# Patient Record
Sex: FEMALE | Race: WHITE | Employment: FULL TIME | URBAN - METROPOLITAN AREA
[De-identification: names, ages, dates, MRNs, and addresses within clinical notes are randomized per-mention and may not be internally consistent; named-entity substitution may affect disease eponyms.]

---

## 2017-03-20 PROBLEM — O35.5XX0 SUSPECTED DAMAGE TO FETUS FROM MATERNAL DRUG USE: Status: ACTIVE | Noted: 2017-03-20

## 2017-06-28 ENCOUNTER — HOSPITAL ENCOUNTER (OUTPATIENT)
Dept: SURGERY | Age: 27
Discharge: HOME | End: 2017-06-28

## 2017-07-19 ENCOUNTER — HOSPITAL ENCOUNTER (INPATIENT)
Age: 27
LOS: 3 days | Discharge: HOME | End: 2017-07-22
Attending: OBSTETRICS & GYNECOLOGY | Admitting: OBSTETRICS & GYNECOLOGY
Payer: COMMERCIAL

## 2017-07-19 PROBLEM — Z3A.40 40 WEEKS GESTATION OF PREGNANCY: Status: ACTIVE | Noted: 2017-07-19

## 2017-07-19 PROBLEM — Z37.9 NORMAL LABOR: Status: ACTIVE | Noted: 2017-07-19

## 2017-07-19 LAB
ABO + RH BLD: NORMAL
AMPHET UR QL SCN: NEGATIVE
BACTERIA SPEC CULT: NORMAL
BARBITURATES UR QL SCN: NEGATIVE
BENZODIAZ UR QL: NEGATIVE
BLOOD GROUP ANTIBODIES SERPL: NORMAL
CANNABINOIDS UR QL SCN: NEGATIVE
COCAINE UR QL SCN: NEGATIVE
ERYTHROCYTE [DISTWIDTH] IN BLOOD BY AUTOMATED COUNT: 13.5 % (ref 11.9–14.6)
HCT VFR BLD AUTO: 38 % (ref 35.8–46.3)
HGB BLD-MCNC: 12.6 G/DL (ref 11.7–15.4)
MCH RBC QN AUTO: 30.7 PG (ref 26.1–32.9)
MCHC RBC AUTO-ENTMCNC: 33.2 G/DL (ref 31.4–35)
MCV RBC AUTO: 92.5 FL (ref 79.6–97.8)
METHADONE UR QL: NEGATIVE
OPIATES UR QL: NEGATIVE
PCP UR QL: NEGATIVE
PLATELET # BLD AUTO: 337 K/UL (ref 150–450)
PMV BLD AUTO: 10.7 FL (ref 10.8–14.1)
RBC # BLD AUTO: 4.11 M/UL (ref 4.05–5.25)
SERVICE CMNT-IMP: NORMAL
SPECIMEN EXP DATE BLD: NORMAL
WBC # BLD AUTO: 16 K/UL (ref 4.3–11.1)

## 2017-07-19 PROCEDURE — 80307 DRUG TEST PRSMV CHEM ANLYZR: CPT | Performed by: OBSTETRICS & GYNECOLOGY

## 2017-07-19 PROCEDURE — 65270000029 HC RM PRIVATE

## 2017-07-19 PROCEDURE — 87641 MR-STAPH DNA AMP PROBE: CPT | Performed by: OBSTETRICS & GYNECOLOGY

## 2017-07-19 PROCEDURE — 86900 BLOOD TYPING SEROLOGIC ABO: CPT | Performed by: OBSTETRICS & GYNECOLOGY

## 2017-07-19 PROCEDURE — 85027 COMPLETE CBC AUTOMATED: CPT | Performed by: OBSTETRICS & GYNECOLOGY

## 2017-07-19 PROCEDURE — 74011250636 HC RX REV CODE- 250/636: Performed by: OBSTETRICS & GYNECOLOGY

## 2017-07-19 PROCEDURE — 36415 COLL VENOUS BLD VENIPUNCTURE: CPT | Performed by: OBSTETRICS & GYNECOLOGY

## 2017-07-19 PROCEDURE — 74011250637 HC RX REV CODE- 250/637: Performed by: OBSTETRICS & GYNECOLOGY

## 2017-07-19 PROCEDURE — 4A1HXCZ MONITORING OF PRODUCTS OF CONCEPTION, CARDIAC RATE, EXTERNAL APPROACH: ICD-10-PCS | Performed by: OBSTETRICS & GYNECOLOGY

## 2017-07-19 RX ORDER — LIDOCAINE HYDROCHLORIDE 20 MG/ML
JELLY TOPICAL
Status: DISCONTINUED | OUTPATIENT
Start: 2017-07-19 | End: 2017-07-20 | Stop reason: HOSPADM

## 2017-07-19 RX ORDER — SODIUM CHLORIDE 0.9 % (FLUSH) 0.9 %
5-10 SYRINGE (ML) INJECTION EVERY 8 HOURS
Status: DISCONTINUED | OUTPATIENT
Start: 2017-07-19 | End: 2017-07-21

## 2017-07-19 RX ORDER — BUPRENORPHINE HYDROCHLORIDE 8 MG/1
8 TABLET SUBLINGUAL DAILY
Status: DISCONTINUED | OUTPATIENT
Start: 2017-07-20 | End: 2017-07-20 | Stop reason: SDUPTHER

## 2017-07-19 RX ORDER — DEXTROSE, SODIUM CHLORIDE, SODIUM LACTATE, POTASSIUM CHLORIDE, AND CALCIUM CHLORIDE 5; .6; .31; .03; .02 G/100ML; G/100ML; G/100ML; G/100ML; G/100ML
125 INJECTION, SOLUTION INTRAVENOUS CONTINUOUS
Status: DISCONTINUED | OUTPATIENT
Start: 2017-07-19 | End: 2017-07-21 | Stop reason: ALTCHOICE

## 2017-07-19 RX ORDER — SODIUM CHLORIDE 0.9 % (FLUSH) 0.9 %
5-10 SYRINGE (ML) INJECTION AS NEEDED
Status: DISCONTINUED | OUTPATIENT
Start: 2017-07-19 | End: 2017-07-21

## 2017-07-19 RX ORDER — LIDOCAINE HYDROCHLORIDE 10 MG/ML
1 INJECTION INFILTRATION; PERINEURAL
Status: DISCONTINUED | OUTPATIENT
Start: 2017-07-19 | End: 2017-07-20 | Stop reason: HOSPADM

## 2017-07-19 RX ORDER — ZOLPIDEM TARTRATE 5 MG/1
5 TABLET ORAL
Status: DISCONTINUED | OUTPATIENT
Start: 2017-07-19 | End: 2017-07-20 | Stop reason: ALTCHOICE

## 2017-07-19 RX ORDER — SODIUM CHLORIDE 0.9 % (FLUSH) 0.9 %
5-10 SYRINGE (ML) INJECTION EVERY 8 HOURS
Status: DISCONTINUED | OUTPATIENT
Start: 2017-07-19 | End: 2017-07-21 | Stop reason: ALTCHOICE

## 2017-07-19 RX ORDER — MINERAL OIL
120 OIL (ML) ORAL
Status: COMPLETED | OUTPATIENT
Start: 2017-07-19 | End: 2017-07-20

## 2017-07-19 RX ORDER — OXYTOCIN/RINGER'S LACTATE 15/250 ML
250 PLASTIC BAG, INJECTION (ML) INTRAVENOUS ONCE
Status: ACTIVE | OUTPATIENT
Start: 2017-07-19 | End: 2017-07-20

## 2017-07-19 RX ORDER — ONDANSETRON 2 MG/ML
4 INJECTION INTRAMUSCULAR; INTRAVENOUS
Status: DISCONTINUED | OUTPATIENT
Start: 2017-07-19 | End: 2017-07-21

## 2017-07-19 RX ORDER — OXYTOCIN/RINGER'S LACTATE 30/500 ML
.5-2 PLASTIC BAG, INJECTION (ML) INTRAVENOUS
Status: DISCONTINUED | OUTPATIENT
Start: 2017-07-19 | End: 2017-07-21

## 2017-07-19 RX ORDER — BUTORPHANOL TARTRATE 1 MG/ML
1 INJECTION INTRAMUSCULAR; INTRAVENOUS
Status: DISCONTINUED | OUTPATIENT
Start: 2017-07-19 | End: 2017-07-20 | Stop reason: HOSPADM

## 2017-07-19 RX ADMIN — DINOPROSTONE 10 MG: 10 INSERT VAGINAL at 21:45

## 2017-07-19 RX ADMIN — VANCOMYCIN HYDROCHLORIDE 1000 MG: 1 INJECTION, POWDER, LYOPHILIZED, FOR SOLUTION INTRAVENOUS at 21:37

## 2017-07-19 NOTE — IP AVS SNAPSHOT
[place] [address] 
[phone] Patient: Chico Soler MRN: [id number] :1990 Current Discharge Medication List  
  
START taking these medications Dose & Instructions Dispensing Information Comments Morning Noon Evening Bedtime  
 benzocaine-menthol 20-0.5 % Aero Commonly known as:  DERMOPLAST Your last dose was: Your next dose is:    
   
   
 Dose:  1 Spray Apply 1 Spray to affected area as needed for Pain. Quantity:  1 mL Refills:  3 HYDROcodone-acetaminophen 5-325 mg per tablet Commonly known as:  Keesha Jacobs Your last dose was: Your next dose is:    
   
   
 Dose:  1-2 Tab Take 1-2 Tabs by mouth every six (6) hours as needed. Max Daily Amount: 8 Tabs. Quantity:  28 Tab Refills:  0  
     
   
   
   
  
 ibuprofen 800 mg tablet Commonly known as:  MOTRIN Your last dose was: Your next dose is:    
   
   
 Dose:  800 mg Take 1 Tab by mouth every eight (8) hours as needed. Quantity:  90 Tab Refills:  0 CONTINUE these medications which have NOT CHANGED Dose & Instructions Dispensing Information Comments Morning Noon Evening Bedtime  
 buprenorphine HCl 8 mg sublingual tablet Commonly known as:  SUBUTEX Your last dose was: Your next dose is:    
   
   
 Dose:  8 mg  
8 mg by SubLINGual route daily. Refills:  0  
     
   
   
   
  
 calcium carbonate 200 mg calcium (500 mg) Chew Commonly known as:  TUMS Your last dose was: Your next dose is:    
   
   
 Dose:  1 Tab Take 1 Tab by mouth daily. Indications: as needed Refills:  0  
     
   
   
   
  
 prenatal 25-iron-folate 6-dha 30 mg iron-1mg -200 mg Cap Your last dose was: Your next dose is: Take  by mouth. [facility] Refills:  0 Where to Get Your Medications These medications were sent to [address] [place], [place] - [address] [facility], [address] Anne Alejandro, [address] Phone:  [phone]  
  benzocaine-menthol 20-0.5 % Aero  
 ibuprofen 800 mg tablet Information on where to get these meds will be given to you by the nurse or doctor. ! Ask your nurse or doctor about these medications HYDROcodone-acetaminophen 5-325 mg per tablet

## 2017-07-19 NOTE — IP AVS SNAPSHOT
Sabrina Moshe 
 
 
 [address] 
[phone] Patient: Ronaldo Primus MRN: [id number] :1990 You are allergic to the following No active allergies Immunizations Administered for This Admission Name Date MMR  Deferred () Tdap  Deferred () Recent Documentation Height Weight Breastfeeding? BMI OB Status Smoking Status 1.803 m 86.6 kg Yes 26.64 kg/m2 Pregnant Former Smoker Emergency Contacts Name Discharge Info Relation Home Work Mobile Paz Gibson  Mother [14] [phone] About your hospitalization You were admitted on:  2017 You last received care in the:  [address] [facility] You were discharged on:  2017 Unit phone number:  [phone] Why you were hospitalized Your primary diagnosis was:  40 Weeks Gestation Of Pregnancy Your diagnoses also included:  Normal Labor Providers Seen During Your Hospitalizations Provider Role Specialty Primary office phone Madelin Ventura MD Attending Provider Obstetrics & Gynecology [phone] Your Primary Care Physician (PCP) Primary Care Physician Office Phone Office Fax NONE ** None ** ** None ** Follow-up Information Follow up With Details Comments Contact Info None   None ([phone]) Patient stated that they have no PCP Rory Smith MD In 6 weeks call office to schedule an appointment to be seen in 6 weeks [address] [address] [phone] Your Appointments 2017  2:05 PM EDT POSTPARTUM VISIT with Deangelo Pang MD  
[facility] ([facility]) [address] [address] [phone] Current Discharge Medication List  
  
START taking these medications Dose & Instructions Dispensing Information Comments Morning Noon Evening Bedtime benzocaine-menthol 20-0.5 % Aero Commonly known as:  DERMOPLAST Your last dose was: Your next dose is:    
   
   
 Dose:  1 Spray Apply 1 Spray to affected area as needed for Pain. Quantity:  1 mL Refills:  3 HYDROcodone-acetaminophen 5-325 mg per tablet Commonly known as:  Renetta De La Torre Your last dose was: Your next dose is:    
   
   
 Dose:  1-2 Tab Take 1-2 Tabs by mouth every six (6) hours as needed. Max Daily Amount: 8 Tabs. Quantity:  28 Tab Refills:  0  
     
   
   
   
  
 ibuprofen 800 mg tablet Commonly known as:  MOTRIN Your last dose was: Your next dose is:    
   
   
 Dose:  800 mg Take 1 Tab by mouth every eight (8) hours as needed. Quantity:  90 Tab Refills:  0 CONTINUE these medications which have NOT CHANGED Dose & Instructions Dispensing Information Comments Morning Noon Evening Bedtime  
 buprenorphine HCl 8 mg sublingual tablet Commonly known as:  SUBUTEX Your last dose was: Your next dose is:    
   
   
 Dose:  8 mg  
8 mg by SubLINGual route daily. Refills:  0  
     
   
   
   
  
 calcium carbonate 200 mg calcium (500 mg) Chew Commonly known as:  TUMS Your last dose was: Your next dose is:    
   
   
 Dose:  1 Tab Take 1 Tab by mouth daily. Indications: as needed Refills:  0  
     
   
   
   
  
 prenatal 25-iron-folate 6-dha 30 mg iron-1mg -200 mg Cap Your last dose was: Your next dose is: Take  by mouth. [facility] Refills:  0 Where to Get Your Medications These medications were sent to [address] - [address] [facility], [address] [place] [address] Drive Phone:  [phone]  
  benzocaine-menthol 20-0.5 % Aero  
 ibuprofen 800 mg tablet Information on where to get these meds will be given to you by the nurse or doctor. ! Ask your nurse or doctor about these medications HYDROcodone-acetaminophen 5-325 mg per tablet Discharge Instructions After Your Delivery (the Postpartum Period): Care Instructions Your Care Instructions Congratulations on the birth of your baby. Like pregnancy, the  period can be a time of excitement, pierce, and exhaustion. You may look at your wondrous little baby and feel happy. You may also be overwhelmed by your new sleep hours and new responsibilities. At first, babies often sleep during the days and are awake at night. They do not have a pattern or routine. They may make sudden gasps, jerk themselves awake, or look like they have crossed eyes. These are all normal, and they may even make you smile. In these first weeks after delivery, try to take good care of yourself. It may take 4 to 6 weeks to feel like yourself again, and possibly longer if you had a  birth. You will likely feel very tired for several weeks. Your days will be full of ups and downs, but lots of pierce as well. Follow-up care is a key part of your treatment and safety. Be sure to make and go to all appointments, and call your doctor if you are having problems. It's also a good idea to know your test results and keep a list of the medicines you take. How can you care for yourself at home? Take care of your body after delivery · Use pads instead of tampons for the bloody flow that may last as long as 2 weeks. · Ease cramps with ibuprofen (Advil, Motrin). · Ease soreness of hemorrhoids and the area between your vagina and rectum with ice compresses or witch hazel pads. · Ease constipation by drinking lots of fluid and eating high-fiber foods. Ask your doctor about over-the-counter stool softeners. · Cleanse yourself with a gentle squeeze of warm water from a bottle instead of wiping with toilet paper. · Take a sitz bath in warm water several times a day. · Wear a good nursing bra. Ease sore and swollen breasts with warm, wet washcloths. · If you are not breastfeeding, use ice rather than heat for breast soreness. · Your period may not start for several months if you are breastfeeding. You may bleed more, and longer at first, than you did before you got pregnant. · Wait until you are healed (about 4 to 6 weeks) before you have sexual intercourse. Your doctor will tell you when it is okay to have sex. · Try not to travel with your baby for 5 or 6 weeks. If you take a long car trip, make frequent stops to walk around and stretch. Avoid exhaustion · Rest every day. Try to nap when your baby naps. · Ask another adult to be with you for a few days after delivery. · Plan for  if you have other children. · Stay flexible so you can eat at odd hours and sleep when you need to. Both you and your baby are making new schedules. · Plan small trips to get out of the house. Change can make you feel less tired. · Ask for help with housework, cooking, and shopping. Remind yourself that your job is to care for your baby. Know about help for postpartum depression · \"Baby blues\" are common for the first 1 to 2 weeks after birth. You may cry or feel sad or irritable for no reason. · Rest whenever you can. Being tired makes it harder to handle your emotions. · Go for walks with your baby. · Talk to your partner, friends, and family about your feelings. · If your symptoms last for more than a few weeks, or if you feel very depressed, ask your doctor for help. · Postpartum depression can be treated. Support groups and counseling can help. Sometimes medicine can also help. Stay healthy · Eat healthy foods so you have more energy, make good breast milk, and lose extra baby pounds. · If you breastfeed, avoid alcohol and drugs. Stay smoke-free. If you quit during pregnancy, congratulations. · Start daily exercise after 4 to 6 weeks, but rest when you feel tired. · Learn exercises to tone your belly. Do Kegel exercises to regain strength in your pelvic muscles. You can do these exercises while you stand or sit. ¨ Squeeze the same muscles you would use to stop your urine. Your belly and thighs should not move. ¨ Hold the squeeze for 3 seconds, and then relax for 3 seconds. ¨ Start with 3 seconds. Then add 1 second each week until you are able to squeeze for 10 seconds. ¨ Repeat the exercise 10 to 15 times for each session. Do three or more sessions each day. · Find a class for new mothers and new babies that has an exercise time. · If you had a  birth, give yourself a bit more time before you exercise, and be careful. When should you call for help? Call 911 anytime you think you may need emergency care. For example, call if: 
· You passed out (lost consciousness). Call your doctor now or seek immediate medical care if: 
· You have severe vaginal bleeding. This means you are passing blood clots and soaking through a pad each hour for 2 or more hours. · You are dizzy or lightheaded, or you feel like you may faint. · You have a fever. · You have new belly pain, or your pain gets worse. Watch closely for changes in your health, and be sure to contact your doctor if: 
· Your vaginal bleeding seems to be getting heavier. · You have new or worse vaginal discharge. · You feel sad, anxious, or hopeless for more than a few days. · You do not get better as expected. Where can you learn more? Go to http://valery-megan.info/. Enter A461 in the search box to learn more about \"After Your Delivery (the Postpartum Period): Care Instructions. \" Current as of: 2017 Content Version: 11.3 © 3309-5346 Selero.  Care instructions adapted under license by [employer] (which disclaims liability or warranty for this information). If you have questions about a medical condition or this instruction, always ask your healthcare professional. [address] any warranty or liability for your use of this information. Vaginal Childbirth: Care Instructions Your Care Instructions Your body will slowly heal in the next few weeks. It is easy to get too tired and overwhelmed during the first weeks after your baby is born. Changes in your hormones can shift your mood without warning. You may find it hard to meet the extra demands on your energy and time. Take it easy on yourself. Follow-up care is a key part of your treatment and safety. Be sure to make and go to all appointments, and call your doctor if you are having problems. It's also a good idea to know your test results and keep a list of the medicines you take. How can you care for yourself at home? · Vaginal bleeding and cramps ¨ After delivery, you will have a bloody discharge from the vagina. This will turn pink within a week and then white or yellow after about 10 days. It may last for 2 to 4 weeks or longer, until the uterus has healed. Use pads instead of tampons until you stop bleeding. ¨ Do not worry if you pass some blood clots, as long as they are smaller than a golf ball. If you have a tear or stitches in your vaginal area, change the pad at least every 4 hours to prevent soreness and infection. ¨ You may have cramps for the first few days after childbirth. These are normal and occur as the uterus shrinks to normal size. Take an over-the-counter pain medicine, such as acetaminophen (Tylenol), ibuprofen (Advil, Motrin), or naproxen (Aleve), for cramps. Read and follow all instructions on the label. Do not take aspirin, because it can cause more bleeding. ¨ Do not take two or more pain medicines at the same time unless the doctor told you to.  Many pain medicines have acetaminophen, which is Tylenol. Too much acetaminophen (Tylenol) can be harmful. · Stitches ¨ If you have stitches, they will dissolve on their own and do not need to be removed. Follow your doctor's instructions for cleaning the stitched area. ¨ Put ice or a cold pack on your painful area for 10 to 20 minutes at a time, several times a day, for the first few days. Put a thin cloth between the ice and your skin. ¨ Sit in a few inches of warm water (sitz bath) 3 times a day and after bowel movements. The warm water helps with pain and itching. If you do not have a tub, a warm shower might help. · Breast fullness ¨ Your breasts may overfill (engorge) in the first few days after delivery. To help milk flow and to relieve pain, warm your breasts in the shower or by using warm, moist towels before nursing. ¨ If you are not nursing, do not put warmth on your breasts or touch your breasts. Wear a tight bra or sports bra and use ice until the fullness goes away. This usually takes 2 to 3 days. ¨ Put ice or a cold pack on your breast after nursing to reduce swelling and pain. Put a thin cloth between the ice and your skin. · Activity ¨ Eat a balanced diet. Do not try to lose weight by cutting calories. Keep taking your prenatal vitamins, or take a multivitamin. ¨ Get as much rest as you can. Try to take naps when your baby sleeps during the day. ¨ Get some exercise every day. But do not do any heavy exercise until your doctor says it is okay. ¨ Wait until you are healed (about 4 to 6 weeks) before you have sexual intercourse. Your doctor will tell you when it is okay to have sex. ¨ Talk to your doctor about birth control. You can get pregnant even before your period returns. Also, you can get pregnant while you are breastfeeding. · Mental health ¨ It is normal to have some sadness, anxiety, sleeplessness, and mood swings after you go home.  If you feel upset or hopeless for more than a few days or are having trouble doing the things you need to do, talk to your doctor. · Constipation and hemorrhoids ¨ Drink plenty of fluids, enough so that your urine is light yellow or clear like water. If you have kidney, heart, or liver disease and have to limit fluids, talk with your doctor before you increase the amount of fluids you drink. ¨ Eat plenty of fiber each day. Have a bran muffin or bran cereal for breakfast, and try eating a piece of fruit for a mid-afternoon snack. ¨ For painful, itchy hemorrhoids, put ice or a cold pack on the area several times a day for 10 minutes at a time. Follow this by putting a warm compress on the area for another 10 to 20 minutes or by sitting in a shallow, warm bath. When should you call for help? Call 911 anytime you think you may need emergency care. For example, call if: 
· You are thinking of hurting yourself, your baby, or anyone else. · You have sudden, severe pain in your belly. · You passed out (lost consciousness). Call your doctor now or seek immediate medical care if: 
· You have severe vaginal bleeding. · You are soaking through a pad each hour for 2 or more hours. · Your vaginal bleeding seems to be getting heavier or is still bright red 4 days after delivery. · You are dizzy or lightheaded, or you feel like you may faint. · You are vomiting or cannot keep fluids down. · You have a fever. · You have new or more belly pain. · You pass tissue (not just blood). · Your vaginal discharge smells bad. · Your belly feels tender or full and hard. · Your breasts are continuously painful or red. · You feel sad, anxious, or hopeless for more than a few days. Watch closely for changes in your health, and be sure to contact your doctor if you have any problems. Where can you learn more? Go to http://valery-megan.info/. Enter Z958 in the search box to learn more about \"Vaginal Childbirth: Care Instructions. \" 
 Current as of: March 16, 2017 Content Version: 11.3 © 5943-5021 [employer]Hound. Care instructions adapted under license by Dinda.com.br (which disclaims liability or warranty for this information). If you have questions about a medical condition or this instruction, always ask your healthcare professional. [address] any warranty or liability for your use of this information. DISCHARGE SUMMARY from Nurse The following personal items are in your possession at time of discharge: 
 
Dental Appliances: None Visual Aid: Glasses, At bedside Home Medications: Locked (subutex sent to pharmacy to be verified) Jewelry: Earrings, With patient Clothing: At bedside, Footwear, Pants, Shirt, Undergarments Other Valuables: At bedside, Cell Phone Personal Items Sent to Safe: declined PATIENT INSTRUCTIONS: 
 
After general anesthesia or intravenous sedation, for 24 hours or while taking prescription Narcotics: · Limit your activities · Do not drive and operate hazardous machinery · Do not make important personal or business decisions · Do  not drink alcoholic beverages · If you have not urinated within 8 hours after discharge, please contact your surgeon on call. Report the following to your surgeon: 
· Excessive pain, swelling, redness or odor of or around the surgical area · Temperature over 100.5 · Nausea and vomiting lasting longer than 4 hours or if unable to take medications · Any signs of decreased circulation or nerve impairment to extremity: change in color, persistent  numbness, tingling, coldness or increase pain · Any questions What to do at Home: *  Please give a list of your current medications to your Primary Care Provider. *  Please update this list whenever your medications are discontinued, doses are 
    changed, or new medications (including over-the-counter products) are added. *  Please carry medication information at all times in case of emergency situations. These are general instructions for a healthy lifestyle: No smoking/ No tobacco products/ Avoid exposure to second hand smoke Surgeon General's Warning:  Quitting smoking now greatly reduces serious risk to your health. Obesity, smoking, and sedentary lifestyle greatly increases your risk for illness A healthy diet, regular physical exercise & weight monitoring are important for maintaining a healthy lifestyle You may be retaining fluid if you have a history of heart failure or if you experience any of the following symptoms:  Weight gain of 3 pounds or more overnight or 5 pounds in a week, increased swelling in our hands or feet or shortness of breath while lying flat in bed. Please call your doctor as soon as you notice any of these symptoms; do not wait until your next office visit. Recognize signs and symptoms of STROKE: 
 
F-face looks uneven A-arms unable to move or move unevenly S-speech slurred or non-existent T-time-call 911 as soon as signs and symptoms begin-DO NOT go Back to bed or wait to see if you get better-TIME IS BRAIN. Warning Signs of HEART ATTACK Call 911 if you have these symptoms: 
? Chest discomfort. Most heart attacks involve discomfort in the center of the chest that lasts more than a few minutes, or that goes away and comes back. It can feel like uncomfortable pressure, squeezing, fullness, or pain. ? Discomfort in other areas of the upper body. Symptoms can include pain or discomfort in one or both arms, the back, neck, jaw, or stomach. ? Shortness of breath with or without chest discomfort. ? Other signs may include breaking out in a cold sweat, nausea, or lightheadedness. Don't wait more than five minutes to call [address]! Fast action can save your life.  Calling 911 is almost always the fastest way to get lifesaving treatment. Emergency Medical Services staff can begin treatment when they arrive  up to an hour sooner than if someone gets to the hospital by car. The discharge information has been reviewed with the patient. The patient verbalized understanding. Discharge medications reviewed with the patient and appropriate educational materials and side effects teaching were provided. Discharge Orders None [employer] Announcement We are excited to announce that we are making your provider's discharge notes available to you in uTrack TV. You will see these notes when they are completed and signed by the physician that discharged you from your recent hospital stay. If you have any questions or concerns about any information you see in [employer], please call the Health Information Department where you were seen or reach out to your Primary Care Provider for more information about your plan of care. Introducing [facility] [place] [facility]! Jesse Pedraza introduces uTrack TV patient portal. Now you can access parts of your medical record, email your doctor's office, and request medication refills online. 1. In your internet browser, go to [email or web address] 2. Click on the First Time User? Click Here link in the Sign In box. You will see the New Member Sign Up page. 3. Enter your uTrack TV Access Code exactly as it appears below. You will not need to use this code after youve completed the sign-up process. If you do not sign up before the expiration date, you must request a new code. · uTrack TV Access Code: [email or web address] Expires: 9/11/2017  2:43 PM 
 
4. Enter the last four digits of your Social Security Number (xxxx) and Date of Birth (mm/dd/yyyy) as indicated and click Submit. You will be taken to the next sign-up page. 5. Create a uTrack TV ID. This will be your uTrack TV login ID and cannot be changed, so think of one that is secure and easy to remember. 6. Create a SavvySync password. You can change your password at any time. 7. Enter your Password Reset Question and Answer. This can be used at a later time if you forget your password. 8. Enter your e-mail address. You will receive e-mail notification when new information is available in [address] 9. Click Sign Up. You can now view and download portions of your medical record. 10. Click the Download Summary menu link to download a portable copy of your medical information. If you have questions, please visit the Frequently Asked Questions section of the SavvySync website. Remember, SavvySync is NOT to be used for urgent needs. For medical emergencies, dial 911. Now available from your iPhone and Android! General Information Please provide this summary of care documentation to your next provider. Patient Signature:  ____________________________________________________________ Date:  ____________________________________________________________  
  
Federico Cart Provider Signature:  ____________________________________________________________ Date:  ____________________________________________________________

## 2017-07-20 ENCOUNTER — ANESTHESIA (OUTPATIENT)
Dept: LABOR AND DELIVERY | Age: 27
End: 2017-07-20
Payer: COMMERCIAL

## 2017-07-20 ENCOUNTER — ANESTHESIA EVENT (OUTPATIENT)
Dept: LABOR AND DELIVERY | Age: 27
End: 2017-07-20
Payer: COMMERCIAL

## 2017-07-20 LAB
BASE DEFICIT BLDCOA-SCNC: 2.3 MMOL/L (ref 0–2)
BASE DEFICIT BLDCOV-SCNC: 1 MMOL/L (ref 1.9–7.7)
BDY SITE: ABNORMAL
BDY SITE: ABNORMAL
HCO3 BLDCOA-SCNC: 25 MMOL/L (ref 22–26)
HCO3 BLDV-SCNC: 24 MMOL/L
PCO2 BLDCOA: 52 MMHG (ref 33–49)
PCO2 BLDCOV: 42 MMHG (ref 14.1–43.3)
PH BLDCOA: 7.3 [PH] (ref 7.21–7.31)
PH BLDCOV: 7.38 [PH] (ref 7.2–7.44)
PO2 BLDCOA: 24 MMHG (ref 9–19)
PO2 BLDV: 33 MMHG (ref 30.4–57.2)
SERVICE CMNT-IMP: ABNORMAL
SERVICE CMNT-IMP: ABNORMAL

## 2017-07-20 PROCEDURE — 82803 BLOOD GASES ANY COMBINATION: CPT

## 2017-07-20 PROCEDURE — 77030011943

## 2017-07-20 PROCEDURE — 65270000029 HC RM PRIVATE

## 2017-07-20 PROCEDURE — 74011250636 HC RX REV CODE- 250/636

## 2017-07-20 PROCEDURE — 74011250637 HC RX REV CODE- 250/637: Performed by: OBSTETRICS & GYNECOLOGY

## 2017-07-20 PROCEDURE — 77030003028 HC SUT VCRL J&J -A

## 2017-07-20 PROCEDURE — 74011250636 HC RX REV CODE- 250/636: Performed by: OBSTETRICS & GYNECOLOGY

## 2017-07-20 PROCEDURE — 74011258636 HC RX REV CODE- 258/636: Performed by: OBSTETRICS & GYNECOLOGY

## 2017-07-20 PROCEDURE — 0KQM0ZZ REPAIR PERINEUM MUSCLE, OPEN APPROACH: ICD-10-PCS | Performed by: OBSTETRICS & GYNECOLOGY

## 2017-07-20 PROCEDURE — 77030014125 HC TY EPDRL BBMI -B: Performed by: ANESTHESIOLOGY

## 2017-07-20 PROCEDURE — 75410000002 HC LABOR FEE PER 1 HR

## 2017-07-20 PROCEDURE — 75410000000 HC DELIVERY VAGINAL/SINGLE

## 2017-07-20 PROCEDURE — 77030011846 HC SUT SLV LD STJU -B

## 2017-07-20 PROCEDURE — 75410000003 HC RECOV DEL/VAG/CSECN EA 0.5 HR

## 2017-07-20 PROCEDURE — 76060000078 HC EPIDURAL ANESTHESIA

## 2017-07-20 PROCEDURE — A4300 CATH IMPL VASC ACCESS PORTAL: HCPCS | Performed by: ANESTHESIOLOGY

## 2017-07-20 PROCEDURE — 77010026065 HC OXYGEN MINIMUM MEDICAL AIR

## 2017-07-20 RX ORDER — DIPHENHYDRAMINE HCL 25 MG
25-50 CAPSULE ORAL
Status: DISCONTINUED | OUTPATIENT
Start: 2017-07-20 | End: 2017-07-22 | Stop reason: HOSPADM

## 2017-07-20 RX ORDER — SIMETHICONE 80 MG
80 TABLET,CHEWABLE ORAL
Status: DISCONTINUED | OUTPATIENT
Start: 2017-07-20 | End: 2017-07-22 | Stop reason: HOSPADM

## 2017-07-20 RX ORDER — NALOXONE HYDROCHLORIDE 0.4 MG/ML
0.4 INJECTION, SOLUTION INTRAMUSCULAR; INTRAVENOUS; SUBCUTANEOUS AS NEEDED
Status: DISCONTINUED | OUTPATIENT
Start: 2017-07-20 | End: 2017-07-22 | Stop reason: HOSPADM

## 2017-07-20 RX ORDER — BUPRENORPHINE 2 MG/1
8 TABLET SUBLINGUAL DAILY
Status: DISCONTINUED | OUTPATIENT
Start: 2017-07-21 | End: 2017-07-22 | Stop reason: HOSPADM

## 2017-07-20 RX ORDER — HYDROCODONE BITARTRATE AND ACETAMINOPHEN 5; 325 MG/1; MG/1
1 TABLET ORAL
Status: DISCONTINUED | OUTPATIENT
Start: 2017-07-20 | End: 2017-07-22 | Stop reason: HOSPADM

## 2017-07-20 RX ORDER — BUPRENORPHINE 2 MG/1
8 TABLET SUBLINGUAL DAILY
Status: DISCONTINUED | OUTPATIENT
Start: 2017-07-21 | End: 2017-07-20 | Stop reason: SDUPTHER

## 2017-07-20 RX ORDER — OXYTOCIN/RINGER'S LACTATE 15/250 ML
250 PLASTIC BAG, INJECTION (ML) INTRAVENOUS ONCE
Status: ACTIVE | OUTPATIENT
Start: 2017-07-20 | End: 2017-07-21

## 2017-07-20 RX ORDER — IBUPROFEN 800 MG/1
800 TABLET ORAL
Status: DISCONTINUED | OUTPATIENT
Start: 2017-07-20 | End: 2017-07-22 | Stop reason: HOSPADM

## 2017-07-20 RX ORDER — ZOLPIDEM TARTRATE 5 MG/1
5 TABLET ORAL
Status: DISCONTINUED | OUTPATIENT
Start: 2017-07-20 | End: 2017-07-22 | Stop reason: HOSPADM

## 2017-07-20 RX ORDER — ROPIVACAINE HYDROCHLORIDE 2 MG/ML
INJECTION, SOLUTION EPIDURAL; INFILTRATION; PERINEURAL
Status: DISCONTINUED | OUTPATIENT
Start: 2017-07-20 | End: 2017-07-20 | Stop reason: HOSPADM

## 2017-07-20 RX ORDER — ROPIVACAINE HYDROCHLORIDE 2 MG/ML
INJECTION, SOLUTION EPIDURAL; INFILTRATION; PERINEURAL AS NEEDED
Status: DISCONTINUED | OUTPATIENT
Start: 2017-07-20 | End: 2017-07-20 | Stop reason: HOSPADM

## 2017-07-20 RX ORDER — HYDROCODONE BITARTRATE AND ACETAMINOPHEN 5; 325 MG/1; MG/1
2 TABLET ORAL
Status: DISCONTINUED | OUTPATIENT
Start: 2017-07-20 | End: 2017-07-22 | Stop reason: HOSPADM

## 2017-07-20 RX ADMIN — HYDROCODONE BITARTRATE AND ACETAMINOPHEN 1 TABLET: 5; 325 TABLET ORAL at 15:52

## 2017-07-20 RX ADMIN — OXYTOCIN 2 MILLI-UNITS/MIN: 10 INJECTION, SOLUTION INTRAMUSCULAR; INTRAVENOUS at 09:22

## 2017-07-20 RX ADMIN — SODIUM CHLORIDE, SODIUM LACTATE, POTASSIUM CHLORIDE, CALCIUM CHLORIDE, AND DEXTROSE MONOHYDRATE 125 ML/HR: 600; 310; 30; 20; 5 INJECTION, SOLUTION INTRAVENOUS at 04:12

## 2017-07-20 RX ADMIN — ROPIVACAINE HYDROCHLORIDE 8 ML: 2 INJECTION, SOLUTION EPIDURAL; INFILTRATION; PERINEURAL at 03:35

## 2017-07-20 RX ADMIN — BUPRENORPHINE HCL 8 MG: 8 TABLET SUBLINGUAL at 07:58

## 2017-07-20 RX ADMIN — HYDROCODONE BITARTRATE AND ACETAMINOPHEN 2 TABLET: 5; 325 TABLET ORAL at 20:31

## 2017-07-20 RX ADMIN — BUTORPHANOL TARTRATE 1 MG: 1 INJECTION, SOLUTION INTRAMUSCULAR; INTRAVENOUS at 02:39

## 2017-07-20 RX ADMIN — ONDANSETRON 4 MG: 2 INJECTION INTRAMUSCULAR; INTRAVENOUS at 07:30

## 2017-07-20 RX ADMIN — VANCOMYCIN HYDROCHLORIDE 1000 MG: 1 INJECTION, POWDER, LYOPHILIZED, FOR SOLUTION INTRAVENOUS at 09:24

## 2017-07-20 RX ADMIN — WITCH HAZEL 1 PAD: 500 SOLUTION RECTAL; TOPICAL at 15:07

## 2017-07-20 RX ADMIN — BENZOCAINE AND MENTHOL 1 SPRAY: 20; .5 SPRAY TOPICAL at 15:07

## 2017-07-20 RX ADMIN — HYDROCODONE BITARTRATE AND ACETAMINOPHEN 1 TABLET: 5; 325 TABLET ORAL at 17:00

## 2017-07-20 RX ADMIN — ROPIVACAINE HYDROCHLORIDE 10 ML/HR: 2 INJECTION, SOLUTION EPIDURAL; INFILTRATION; PERINEURAL at 03:35

## 2017-07-20 RX ADMIN — ROPIVACAINE HYDROCHLORIDE: 2 INJECTION, SOLUTION EPIDURAL; INFILTRATION; PERINEURAL at 09:00

## 2017-07-20 RX ADMIN — IBUPROFEN 800 MG: 800 TABLET ORAL at 15:07

## 2017-07-20 RX ADMIN — MINERAL OIL 120 ML: 471.95 OIL ORAL at 09:23

## 2017-07-20 RX ADMIN — SODIUM CHLORIDE, SODIUM LACTATE, POTASSIUM CHLORIDE, AND CALCIUM CHLORIDE 1000 ML: 600; 310; 30; 20 INJECTION, SOLUTION INTRAVENOUS at 03:09

## 2017-07-20 NOTE — ANESTHESIA POSTPROCEDURE EVALUATION
Post-Anesthesia Evaluation and Assessment    Patient: Diamante Waldron MRN: [id number]  SSN: [id number]    YOB: 1990  Age: 23 y.o. Sex: female       Cardiovascular Function/Vital Signs  Visit Vitals    /61 (BP 1 Location: Right arm, BP Patient Position: At rest)    Pulse 77    Temp 37.3 °C (99.2 °F)    Resp 18    Ht 5' 11\" (1.803 m)    Wt 86.6 kg (191 lb)    Breastfeeding Yes    BMI 26.64 kg/m2       Patient is status post epidural anesthesia for * No procedures listed *. Nausea/Vomiting: None    Postoperative hydration reviewed and adequate. Pain:  Pain Scale 1: Numeric (0 - 10) (07/20/17 1234)  Pain Intensity 1: 0 (07/20/17 1234)   Managed    Neurological Status:   Neuro (WDL): Within Defined Limits (07/20/17 1234)  Neuro  Neurologic State: Alert (07/20/17 0342)  Orientation Level: Oriented X4 (07/20/17 0342)  Cognition: Appropriate decision making (07/20/17 0342)  Speech: Clear (07/20/17 0342)  LUE Motor Response: Purposeful (07/20/17 1134)  LLE Motor Response: Numbness; Pharmacologically paralyzed (07/20/17 1134)  RUE Motor Response: Purposeful (07/20/17 1134)  RLE Motor Response: Numbness; Pharmacologically paralyzed (07/20/17 1134)   At baseline    Mental Status and Level of Consciousness: Arousable    Pulmonary Status:   O2 Device: Room air (07/20/17 1234)   Adequate oxygenation and airway patent    Complications related to anesthesia: None    Post-anesthesia assessment completed.  No concerns    Signed By: Eli Shrestha MD     July 20, 2017

## 2017-07-20 NOTE — LACTATION NOTE
Discussed pt's choice of infant feeding method. Feeding options explored, including the importance of exclusive breastfeeding. Pt informed of our breastfeeding support system from from nursing staff and lactation staff during inpatient stay and following discharge. Questions and concerns addressed at this time. Pt confirms breastfeeding is her decision at this time. Andrae Jj

## 2017-07-20 NOTE — LACTATION NOTE
This note was copied from a baby's chart. Pump brought to room and set up. Instructed on equipment, verbalized understanding.

## 2017-07-20 NOTE — ANESTHESIA PREPROCEDURE EVALUATION
Anesthetic History   No history of anesthetic complications            Review of Systems / Medical History  Patient summary reviewed and pertinent labs reviewed    Pulmonary  Within defined limits                 Neuro/Psych   Within defined limits           Cardiovascular                  Exercise tolerance: >4 METS  Comments: Denies CV history   GI/Hepatic/Renal           Liver disease     Endo/Other  Within defined limits           Other Findings              Physical Exam    Airway  Mallampati: II  TM Distance: 4 - 6 cm  Neck ROM: normal range of motion   Mouth opening: Normal     Cardiovascular    Rhythm: regular  Rate: normal         Dental  No notable dental hx       Pulmonary  Breath sounds clear to auscultation               Abdominal  GI exam deferred       Other Findings            Anesthetic Plan    ASA: 2  Anesthesia type: epidural            Anesthetic plan and risks discussed with: Patient and Mother

## 2017-07-20 NOTE — L&D DELIVERY NOTE
Delivery Summary    Patient: Cecile Blancas MRN: [id number]  SSN: [id number]    YOB: 1990  Age: 23 y.o. Sex: female       Information for the patient's :  Fatuma Zaragoza [[id number]]       Labor Events:    Labor: No   Rupture Date: 2017   Rupture Time: 8:25 AM   Rupture Type SROM   Amniotic Fluid Volume: Moderate    Amniotic Fluid Description: Clear     Induction: Prostaglandins       Augmentation: Oxytocin   Labor Events: None     Cervical Ripenin2017 9:45 PM Cervidil     Delivery Events:  Episiotomy: None   Laceration(s): Second degree perineal     Repaired: Yes    Number of Repair Packets: 2   Suture Type and Size: Vicryl 3-0     Estimated Blood Loss (ml): 350ml       Delivery Date: 2017    Delivery Time: 11:15 AM  Delivery Type: Vaginal, Spontaneous Delivery  Sex:  Male     Gestational Age: 41w4d   Delivery Clinician:  Franco Fung  Living Status: Living   Delivery Location: L&D            APGARS  One minute Five minutes Ten minutes   Skin color: 0   1        Heart rate: 2   2        Grimace: 2   2        Muscle tone: 2   2        Breathin   2        Totals: 8   9            Presentation: Vertex    Position:   Occiput Anterior  Resuscitation Method:  Suctioning-bulb; Tactile Stimulation     Meconium Stained: None      Cord Vessels: 3 Vessels      Cord Events:    Cord Blood Sent?:  Yes    Blood Gases Sent?:  Yes    Placenta:  Date/Time:  11:31 AM  Removal: Spontaneous      Appearance: Normal;Intact      Measurements:  Birth Weight: 9 lb 14.2 oz (4.485 kg)      Birth Length: 56 cm      Head Circumference: 37.5 cm      Chest Circumference: 39 cm     Abdominal Girth:       Other Providers:   Abril FONSECA;BRUNO WESLEY;LICHA MEHTA WHITNEY Demetra Ing, Obstetrician;Primary Nurse;Primary Fielding Nurse;Charge Nurse;Nurse Extern           Group B Strep:   Lab Results   Component Value Date/Time    Harmony, External Negative 2017 Information for the patient's :  Aman Roldan [[id number]]     Lab Results   Component Value Date/Time    ABO/Rh(D) A POSITIVE 2017 11:15 AM    ISABELLE IgG NEG 2017 11:15 AM     Lab Results   Component Value Date/Time    APH 7.296 2017 11:15 AM    APCO2 52 (H) 2017 11:15 AM    APO2 24 (H) 2017 11:15 AM    AHCO3 25 2017 11:15 AM    ABDC 2.3 (H) 2017 11:15 AM    EPHV 7.376 2017 11:15 AM    PCO2V 42 2017 11:15 AM    PO2V 33 2017 11:15 AM    HCO3V 24 2017 11:15 AM    EBDV 1.0 (L) 2017 11:15 AM    SITE CORD 2017 11:15 AM    SITE CORD 2017 11:15 AM    RSCOM na at 2017 11 32 10 AM. Not read back. 2017 11:15 AM    RSCOM na at 2017 11 32 24 AM. Not read back. 2017 11:15 AM        Delivery Note        Lab Results   Component Value Date/Time    PH,CORD BLD ARTERIAL 7.296 2017 11:15 AM    PCO2,CORD BLD ARTERIAL 52 2017 11:15 AM    PO2,CORD BLD ARTERIAL 24 2017 11:15 AM    HCO3,CORD BLD ARTERIAL 25 2017 11:15 AM    BASE DEFICIT,CBA 2.3 2017 11:15 AM    SITE CORD 2017 11:15 AM    SITE CORD 2017 11:15 AM    Respiratory comment: na at 2017 11 32 10 AM. Not read back. 2017 11:15 AM    Respiratory comment: na at 2017 11 32 24 AM. Not read back. 2017 11:15 AM            Lab Results   Component Value Date/Time    Rubella, External Non Immune 2016    GrBStrep, External Negative 2017            Procedure:  Patient became completely dilated and pushed. Episiotomy was not performed. Patient delivered head. Mouth and nares suctioned on the perineum with bulb syringe. Shoulders delivered without any evidence of dystocia but it was close. Baby needed to be assisted out of vagina on abdomen, buttocks and even legs. He had terminal meconium. Baby delivered in the bed, was dried. The cord was clamped and cut.   Cord pH and cord blood was obtained. The baby was taken to warmer for evaluation by baby nurse. The perineum was examined. Very small second degree was repaired with 3.0 vicryl. Periurethral on right repaired with 2 interrupted of 4.0 vicryl. The placenta was delivered spontaneously. It was examined. It was intact with three vessels. Mom and baby are doing well.          Lova Bernheim, MD  7/20/2017  4:30 PM

## 2017-07-20 NOTE — ANESTHESIA PROCEDURE NOTES
Epidural Block    Start time: 7/20/2017 3:35 AM  End time: 7/20/2017 3:45 AM  Performed by: Carla Reyes  Authorized by: Osbaldo RING     Pre-Procedure  Indication: labor epidural    Preanesthetic Checklist: patient identified, risks and benefits discussed, anesthesia consent, site marked, patient being monitored, timeout performed and anesthesia consent    Timeout Time: 03:44        Epidural:   Patient position:  Seated  Prep region:  Lumbar  Prep: Patient draped and Chlorhexidine    Location:  L3-4    Needle and Epidural Catheter:   Needle Type:  Tuohy  Needle Gauge:  17 G  Injection Technique:  Loss of resistance using saline  Attempts:  1  Catheter Size:  19 G  Catheter at Skin Depth (cm):  10  Depth in Epidural Space (cm):  5  Events: no blood with aspiration, no cerebrospinal fluid with aspiration, no paresthesia and negative aspiration test    Test Dose:  Lidocaine 1.5% w/ epi and negative    Assessment:   Catheter Secured:  Tegaderm and tape  Insertion:  Uncomplicated  Patient tolerance:  Patient tolerated the procedure well with no immediate complications

## 2017-07-20 NOTE — ROUTINE PROCESS
SBAR IN Report: Mother    Verbal report received from Mamta Marie RN (full name & credentials) on this patient, who is now being transferred from L & D (unit) for routine progression of care. The patient is not wearing a green \"Anesthesia-Duramorph\" band. Report consisted of patient's Situation, Background, Assessment and Recommendations (SBAR). Flushing ID bands were compared with the identification form, and verified with the patient and transferring nurse. Information from the SBAR and the [facility] Report was reviewed with the transferring nurse; opportunity for questions and clarification provided.

## 2017-07-20 NOTE — LACTATION NOTE
This note was copied from a baby's chart. Assisted pt with breastfeeding, placed infant on right side in football. Instructed mom on positioning infant, hand expression, signs of a good latch. Infant sleepy at breast.  Performed suck assessment. Infant will do 1-2 sucks and then bite. Tried for latch for 5-7 minutes, unsuccessful. Instructed on skin-to-skin, placed baby on mom's chest, verbalized understanding.

## 2017-07-21 PROCEDURE — 74011250637 HC RX REV CODE- 250/637: Performed by: OBSTETRICS & GYNECOLOGY

## 2017-07-21 PROCEDURE — 74011250636 HC RX REV CODE- 250/636: Performed by: OBSTETRICS & GYNECOLOGY

## 2017-07-21 PROCEDURE — 65270000029 HC RM PRIVATE

## 2017-07-21 RX ADMIN — BUPRENORPHINE HYDROCHLORIDE SUBLINGUAL 8 MG: 2 TABLET SUBLINGUAL at 09:56

## 2017-07-21 RX ADMIN — HYDROCODONE BITARTRATE AND ACETAMINOPHEN 2 TABLET: 5; 325 TABLET ORAL at 00:12

## 2017-07-21 RX ADMIN — HYDROCODONE BITARTRATE AND ACETAMINOPHEN 2 TABLET: 5; 325 TABLET ORAL at 04:15

## 2017-07-21 RX ADMIN — HYDROCODONE BITARTRATE AND ACETAMINOPHEN 2 TABLET: 5; 325 TABLET ORAL at 13:21

## 2017-07-21 RX ADMIN — IBUPROFEN 800 MG: 800 TABLET ORAL at 17:48

## 2017-07-21 RX ADMIN — HYDROCODONE BITARTRATE AND ACETAMINOPHEN 2 TABLET: 5; 325 TABLET ORAL at 09:16

## 2017-07-21 NOTE — LACTATION NOTE
In to see mom and infant for first time. Mom is pumping and feeding infant expressed colostrum as well as formula. Mom is debating as to whether or not to just pumping and bottle feed infant or possibly place infant to breast. She stated that she has attempted a couple of times but infant did not actually latch. She did ask questions about positioning and we reviewed that as well. She wanted to know what I recommended and I informed her that was a personal choice and that if she decided to pump only her infant was still getting her breastmilk. She stated that she may wait until her milk is in to try again. She does have a personal breastpump that she can use at home. Lactation consultant will follow up as needed.

## 2017-07-22 VITALS
HEART RATE: 56 BPM | HEIGHT: 71 IN | BODY MASS INDEX: 26.74 KG/M2 | DIASTOLIC BLOOD PRESSURE: 49 MMHG | RESPIRATION RATE: 16 BRPM | TEMPERATURE: 97.6 F | WEIGHT: 191 LBS | SYSTOLIC BLOOD PRESSURE: 117 MMHG

## 2017-07-22 PROCEDURE — 74011250636 HC RX REV CODE- 250/636: Performed by: OBSTETRICS & GYNECOLOGY

## 2017-07-22 PROCEDURE — 74011250637 HC RX REV CODE- 250/637: Performed by: OBSTETRICS & GYNECOLOGY

## 2017-07-22 RX ORDER — HYDROCODONE BITARTRATE AND ACETAMINOPHEN 5; 325 MG/1; MG/1
1-2 TABLET ORAL
Qty: 28 TAB | Refills: 0 | Status: SHIPPED | OUTPATIENT
Start: 2017-07-22 | End: 2017-08-01

## 2017-07-22 RX ORDER — IBUPROFEN 800 MG/1
800 TABLET ORAL
Qty: 90 TAB | Refills: 0 | Status: SHIPPED | OUTPATIENT
Start: 2017-07-22 | End: 2017-08-30

## 2017-07-22 RX ADMIN — HYDROCODONE BITARTRATE AND ACETAMINOPHEN 2 TABLET: 5; 325 TABLET ORAL at 08:33

## 2017-07-22 RX ADMIN — IBUPROFEN 800 MG: 800 TABLET ORAL at 07:56

## 2017-07-22 RX ADMIN — HYDROCODONE BITARTRATE AND ACETAMINOPHEN 2 TABLET: 5; 325 TABLET ORAL at 17:35

## 2017-07-22 RX ADMIN — IBUPROFEN 800 MG: 800 TABLET ORAL at 17:35

## 2017-07-22 RX ADMIN — HYDROCODONE BITARTRATE AND ACETAMINOPHEN 2 TABLET: 5; 325 TABLET ORAL at 12:56

## 2017-07-22 RX ADMIN — IBUPROFEN 800 MG: 800 TABLET ORAL at 00:38

## 2017-07-22 RX ADMIN — HYDROCODONE BITARTRATE AND ACETAMINOPHEN 2 TABLET: 5; 325 TABLET ORAL at 04:36

## 2017-07-22 RX ADMIN — WITCH HAZEL 1 PAD: 500 SOLUTION RECTAL; TOPICAL at 17:35

## 2017-07-22 RX ADMIN — HYDROCODONE BITARTRATE AND ACETAMINOPHEN 2 TABLET: 5; 325 TABLET ORAL at 00:38

## 2017-07-22 RX ADMIN — BUPRENORPHINE HYDROCHLORIDE SUBLINGUAL 8 MG: 2 TABLET SUBLINGUAL at 08:02

## 2017-07-22 RX ADMIN — BENZOCAINE AND MENTHOL 1 SPRAY: 20; .5 SPRAY TOPICAL at 17:35

## 2017-07-22 NOTE — DISCHARGE INSTRUCTIONS
After Your Delivery (the Postpartum Period): Care Instructions  Your Care Instructions  Congratulations on the birth of your baby. Like pregnancy, the  period can be a time of excitement, pierce, and exhaustion. You may look at your wondrous little baby and feel happy. You may also be overwhelmed by your new sleep hours and new responsibilities. At first, babies often sleep during the days and are awake at night. They do not have a pattern or routine. They may make sudden gasps, jerk themselves awake, or look like they have crossed eyes. These are all normal, and they may even make you smile. In these first weeks after delivery, try to take good care of yourself. It may take 4 to 6 weeks to feel like yourself again, and possibly longer if you had a  birth. You will likely feel very tired for several weeks. Your days will be full of ups and downs, but lots of pierce as well. Follow-up care is a key part of your treatment and safety. Be sure to make and go to all appointments, and call your doctor if you are having problems. It's also a good idea to know your test results and keep a list of the medicines you take. How can you care for yourself at home? Take care of your body after delivery  · Use pads instead of tampons for the bloody flow that may last as long as 2 weeks. · Ease cramps with ibuprofen (Advil, Motrin). · Ease soreness of hemorrhoids and the area between your vagina and rectum with ice compresses or witch hazel pads. · Ease constipation by drinking lots of fluid and eating high-fiber foods. Ask your doctor about over-the-counter stool softeners. · Cleanse yourself with a gentle squeeze of warm water from a bottle instead of wiping with toilet paper. · Take a sitz bath in warm water several times a day. · Wear a good nursing bra. Ease sore and swollen breasts with warm, wet washcloths. · If you are not breastfeeding, use ice rather than heat for breast soreness.   · Your period may not start for several months if you are breastfeeding. You may bleed more, and longer at first, than you did before you got pregnant. · Wait until you are healed (about 4 to 6 weeks) before you have sexual intercourse. Your doctor will tell you when it is okay to have sex. · Try not to travel with your baby for 5 or 6 weeks. If you take a long car trip, make frequent stops to walk around and stretch. Avoid exhaustion  · Rest every day. Try to nap when your baby naps. · Ask another adult to be with you for a few days after delivery. · Plan for  if you have other children. · Stay flexible so you can eat at odd hours and sleep when you need to. Both you and your baby are making new schedules. · Plan small trips to get out of the house. Change can make you feel less tired. · Ask for help with housework, cooking, and shopping. Remind yourself that your job is to care for your baby. Know about help for postpartum depression  · \"Baby blues\" are common for the first 1 to 2 weeks after birth. You may cry or feel sad or irritable for no reason. · Rest whenever you can. Being tired makes it harder to handle your emotions. · Go for walks with your baby. · Talk to your partner, friends, and family about your feelings. · If your symptoms last for more than a few weeks, or if you feel very depressed, ask your doctor for help. · Postpartum depression can be treated. Support groups and counseling can help. Sometimes medicine can also help. Stay healthy  · Eat healthy foods so you have more energy, make good breast milk, and lose extra baby pounds. · If you breastfeed, avoid alcohol and drugs. Stay smoke-free. If you quit during pregnancy, congratulations. · Start daily exercise after 4 to 6 weeks, but rest when you feel tired. · Learn exercises to tone your belly. Do Kegel exercises to regain strength in your pelvic muscles. You can do these exercises while you stand or sit.   ¨ Squeeze the same muscles you would use to stop your urine. Your belly and thighs should not move. ¨ Hold the squeeze for 3 seconds, and then relax for 3 seconds. ¨ Start with 3 seconds. Then add 1 second each week until you are able to squeeze for 10 seconds. ¨ Repeat the exercise 10 to 15 times for each session. Do three or more sessions each day. · Find a class for new mothers and new babies that has an exercise time. · If you had a  birth, give yourself a bit more time before you exercise, and be careful. When should you call for help? Call 911 anytime you think you may need emergency care. For example, call if:  · You passed out (lost consciousness). Call your doctor now or seek immediate medical care if:  · You have severe vaginal bleeding. This means you are passing blood clots and soaking through a pad each hour for 2 or more hours. · You are dizzy or lightheaded, or you feel like you may faint. · You have a fever. · You have new belly pain, or your pain gets worse. Watch closely for changes in your health, and be sure to contact your doctor if:  · Your vaginal bleeding seems to be getting heavier. · You have new or worse vaginal discharge. · You feel sad, anxious, or hopeless for more than a few days. · You do not get better as expected. Where can you learn more? Go to http://valery-megan.info/. Enter A461 in the search box to learn more about \"After Your Delivery (the Postpartum Period): Care Instructions. \"  Current as of: 2017  Content Version: 11.3  © 5229-8174 [employer]. Care instructions adapted under license by [employer] (which disclaims liability or warranty for this information). If you have questions about a medical condition or this instruction, always ask your healthcare professional. [address] any warranty or liability for your use of this information.              Vaginal Childbirth: Care Instructions  Your Care Instructions  Your body will slowly heal in the next few weeks. It is easy to get too tired and overwhelmed during the first weeks after your baby is born. Changes in your hormones can shift your mood without warning. You may find it hard to meet the extra demands on your energy and time. Take it easy on yourself. Follow-up care is a key part of your treatment and safety. Be sure to make and go to all appointments, and call your doctor if you are having problems. It's also a good idea to know your test results and keep a list of the medicines you take. How can you care for yourself at home? · Vaginal bleeding and cramps  ¨ After delivery, you will have a bloody discharge from the vagina. This will turn pink within a week and then white or yellow after about 10 days. It may last for 2 to 4 weeks or longer, until the uterus has healed. Use pads instead of tampons until you stop bleeding. ¨ Do not worry if you pass some blood clots, as long as they are smaller than a golf ball. If you have a tear or stitches in your vaginal area, change the pad at least every 4 hours to prevent soreness and infection. ¨ You may have cramps for the first few days after childbirth. These are normal and occur as the uterus shrinks to normal size. Take an over-the-counter pain medicine, such as acetaminophen (Tylenol), ibuprofen (Advil, Motrin), or naproxen (Aleve), for cramps. Read and follow all instructions on the label. Do not take aspirin, because it can cause more bleeding. ¨ Do not take two or more pain medicines at the same time unless the doctor told you to. Many pain medicines have acetaminophen, which is Tylenol. Too much acetaminophen (Tylenol) can be harmful. · Stitches  ¨ If you have stitches, they will dissolve on their own and do not need to be removed. Follow your doctor's instructions for cleaning the stitched area.   ¨ Put ice or a cold pack on your painful area for 10 to 20 minutes at a time, several times a day, for the first few days. Put a thin cloth between the ice and your skin. ¨ Sit in a few inches of warm water (sitz bath) 3 times a day and after bowel movements. The warm water helps with pain and itching. If you do not have a tub, a warm shower might help. · Breast fullness  ¨ Your breasts may overfill (engorge) in the first few days after delivery. To help milk flow and to relieve pain, warm your breasts in the shower or by using warm, moist towels before nursing. ¨ If you are not nursing, do not put warmth on your breasts or touch your breasts. Wear a tight bra or sports bra and use ice until the fullness goes away. This usually takes 2 to 3 days. ¨ Put ice or a cold pack on your breast after nursing to reduce swelling and pain. Put a thin cloth between the ice and your skin. · Activity  ¨ Eat a balanced diet. Do not try to lose weight by cutting calories. Keep taking your prenatal vitamins, or take a multivitamin. ¨ Get as much rest as you can. Try to take naps when your baby sleeps during the day. ¨ Get some exercise every day. But do not do any heavy exercise until your doctor says it is okay. ¨ Wait until you are healed (about 4 to 6 weeks) before you have sexual intercourse. Your doctor will tell you when it is okay to have sex. ¨ Talk to your doctor about birth control. You can get pregnant even before your period returns. Also, you can get pregnant while you are breastfeeding. · Mental health  ¨ It is normal to have some sadness, anxiety, sleeplessness, and mood swings after you go home. If you feel upset or hopeless for more than a few days or are having trouble doing the things you need to do, talk to your doctor. · Constipation and hemorrhoids  ¨ Drink plenty of fluids, enough so that your urine is light yellow or clear like water. If you have kidney, heart, or liver disease and have to limit fluids, talk with your doctor before you increase the amount of fluids you drink.   ¨ Eat plenty of fiber each day. Have a bran muffin or bran cereal for breakfast, and try eating a piece of fruit for a mid-afternoon snack. ¨ For painful, itchy hemorrhoids, put ice or a cold pack on the area several times a day for 10 minutes at a time. Follow this by putting a warm compress on the area for another 10 to 20 minutes or by sitting in a shallow, warm bath. When should you call for help? Call 911 anytime you think you may need emergency care. For example, call if:  · You are thinking of hurting yourself, your baby, or anyone else. · You have sudden, severe pain in your belly. · You passed out (lost consciousness). Call your doctor now or seek immediate medical care if:  · You have severe vaginal bleeding. · You are soaking through a pad each hour for 2 or more hours. · Your vaginal bleeding seems to be getting heavier or is still bright red 4 days after delivery. · You are dizzy or lightheaded, or you feel like you may faint. · You are vomiting or cannot keep fluids down. · You have a fever. · You have new or more belly pain. · You pass tissue (not just blood). · Your vaginal discharge smells bad. · Your belly feels tender or full and hard. · Your breasts are continuously painful or red. · You feel sad, anxious, or hopeless for more than a few days. Watch closely for changes in your health, and be sure to contact your doctor if you have any problems. Where can you learn more? Go to http://valery-megan.info/. Enter S980 in the search box to learn more about \"Vaginal Childbirth: Care Instructions. \"  Current as of: March 16, 2017  Content Version: 11.3  © 7970-8976 [employer]. Care instructions adapted under license by [employer] (which disclaims liability or warranty for this information).  If you have questions about a medical condition or this instruction, always ask your healthcare professional. [employer] disclaims any warranty or liability for your use of this information. DISCHARGE SUMMARY from Nurse    The following personal items are in your possession at time of discharge:    Dental Appliances: None  Visual Aid: Glasses, At bedside     Home Medications: Locked (subutex sent to pharmacy to be verified)  Jewelry: Earrings, With patient  Clothing: At bedside, Footwear, Pants, Shirt, Undergarments  Other Valuables: At bedside, Cell Phone  Personal Items Sent to Safe: declined          PATIENT INSTRUCTIONS:    After general anesthesia or intravenous sedation, for 24 hours or while taking prescription Narcotics:  · Limit your activities  · Do not drive and operate hazardous machinery  · Do not make important personal or business decisions  · Do  not drink alcoholic beverages  · If you have not urinated within 8 hours after discharge, please contact your surgeon on call. Report the following to your surgeon:  · Excessive pain, swelling, redness or odor of or around the surgical area  · Temperature over 100.5  · Nausea and vomiting lasting longer than 4 hours or if unable to take medications  · Any signs of decreased circulation or nerve impairment to extremity: change in color, persistent  numbness, tingling, coldness or increase pain  · Any questions        What to do at Home:    *  Please give a list of your current medications to your Primary Care Provider. *  Please update this list whenever your medications are discontinued, doses are      changed, or new medications (including over-the-counter products) are added. *  Please carry medication information at all times in case of emergency situations. These are general instructions for a healthy lifestyle:    No smoking/ No tobacco products/ Avoid exposure to second hand smoke    Surgeon General's Warning:  Quitting smoking now greatly reduces serious risk to your health.     Obesity, smoking, and sedentary lifestyle greatly increases your risk for illness    A healthy diet, regular physical exercise & weight monitoring are important for maintaining a healthy lifestyle    You may be retaining fluid if you have a history of heart failure or if you experience any of the following symptoms:  Weight gain of 3 pounds or more overnight or 5 pounds in a week, increased swelling in our hands or feet or shortness of breath while lying flat in bed. Please call your doctor as soon as you notice any of these symptoms; do not wait until your next office visit. Recognize signs and symptoms of STROKE:    F-face looks uneven    A-arms unable to move or move unevenly    S-speech slurred or non-existent    T-time-call 911 as soon as signs and symptoms begin-DO NOT go       Back to bed or wait to see if you get better-TIME IS BRAIN. Warning Signs of HEART ATTACK     Call 911 if you have these symptoms:   Chest discomfort. Most heart attacks involve discomfort in the center of the chest that lasts more than a few minutes, or that goes away and comes back. It can feel like uncomfortable pressure, squeezing, fullness, or pain.  Discomfort in other areas of the upper body. Symptoms can include pain or discomfort in one or both arms, the back, neck, jaw, or stomach.  Shortness of breath with or without chest discomfort.  Other signs may include breaking out in a cold sweat, nausea, or lightheadedness. Don't wait more than five minutes to call 911 - MINUTES MATTER! Fast action can save your life. Calling 911 is almost always the fastest way to get lifesaving treatment. Emergency Medical Services staff can begin treatment when they arrive -- up to an hour sooner than if someone gets to the hospital by car. The discharge information has been reviewed with the patient. The patient verbalized understanding. Discharge medications reviewed with the patient and appropriate educational materials and side effects teaching were provided.

## 2017-07-22 NOTE — DISCHARGE SUMMARY
Obstetrical Discharge Summary     Name: Radha Mendoza MRN: [id number]  SSN: [id number]    YOB: 1990  Age: 23 y.o. Sex: female      Admit Date: 2017    Discharge Date: 2017     Admitting Physician: Shawna Silverman MD     Attending Physician:  Ryan Emmanuel MD     * Admission Diagnoses: cervidil;Normal labor;40 weeks gestation of pregnancy    * Discharge Diagnoses:   Information for the patient's :  Keeley Lipoma [[id number]]   Delivery of a 9 lb 14.2 oz (4.485 kg) male infant via Vaginal, Spontaneous Delivery on 2017 at 11:15 AM  by . Apgars were 8 and 9. Additional Diagnoses:   Hospital Problems as of 2017  Date Reviewed: 2017          Codes Class Noted - Resolved POA    Normal labor ICD-10-CM: O80, Z37.9  ICD-9-CM: 190  2017 - Present Unknown        * (Principal)40 weeks gestation of pregnancy ICD-10-CM: Z3A.40  ICD-9-CM: V22.2  2017 - Present Unknown             Lab Results   Component Value Date/Time    ABO/Rh(D) A POSITIVE 2017 07:00 PM    Rubella, External Non Immune 2016    GrBStrep, External Negative 2017    ABO,Rh A Positive 2016    There is no immunization history for the selected administration types on file for this patient. * Procedures: vag delivery             * Discharge Condition: good    [facility] Hospital Course: Normal hospital course following the delivery. * Disposition: Home    Discharge Medications:   Current Discharge Medication List      START taking these medications    Details   benzocaine-menthol (DERMOPLAST) 20-0.5 % aero Apply 1 Spray to affected area as needed for Pain. Qty: 1 mL, Refills: 3      ibuprofen (MOTRIN) 800 mg tablet Take 1 Tab by mouth every eight (8) hours as needed. Qty: 90 Tab, Refills: 0      HYDROcodone-acetaminophen (NORCO) 5-325 mg per tablet Take 1-2 Tabs by mouth every six (6) hours as needed. Max Daily Amount: 8 Tabs.   Qty: 28 Tab, Refills: 0         CONTINUE these medications which have NOT CHANGED    Details   buprenorphine HCl (SUBUTEX) 8 mg sublingual tablet 8 mg by SubLINGual route daily. calcium carbonate (TUMS) 200 mg calcium (500 mg) chew Take 1 Tab by mouth daily. Indications: as needed      prenatal no. 25-iron-FA #6-dha 30 mg iron-1mg -200 mg cap Take  by mouth. [facility]             * Follow-up Care/Patient Instructions:   Activity: No sex for 6 weeks and No driving while on analgesics  Diet: Regular Diet  Wound Care: As directed    Follow-up Information     Follow up With Details Comments Contact Info    None   None ([phone]) Patient stated that they have no PCP             Signed By:  Kenna Ross MD     July 22, 2017

## 2017-07-22 NOTE — LACTATION NOTE
In to see mom prior to her discharge. Infant is staying in hospital for one more day. Mom stated that she continues to pump and is expressing up to 5ml when she pumps. She stated that she plans to continue to provide her breast milk for her infant and when her milk is in she plans to attempt to place infant to breast. Informed mom that lactation will be available as needed.

## 2018-11-17 NOTE — PROGRESS NOTES
. Discharge teaching complete, pt verbalizes understanding; questions encouraged.
07/20/17 0820   Straight Cath   Straight Cath Nurse performed cath   Number of Attempts 1   Catheter Size 16 FR   Time Catheter Inserted 0820   Time Catheter Removed 0825   Urine 400 mL   Urine Assessment   Urinary Status Straight cath   Urine Color Yellow/straw   Urine Appearance Clear
07/20/17 0825   Membranes   Membrane Status SROM   Rupture Date 07/20/17   Rupture Time 0825   Amniotic Fluid Description Clear   Amniotic Fluid Volume  Large   Cervical Exam   Dilation (cm) 10   Eff 100 %   Station +1
07/20/17 1035   Straight Cath   Straight Cath (per Dr Horacio Miguel)   Number of Attempts 1   Catheter Size 16 FR   Time Catheter Inserted 1035   Time Catheter Removed 1037   Urine 100 mL   Urine Assessment   Urinary Status Straight cath   Urine Color Carmen   Urine Appearance Clear
1st attempt for preadmission call. No answer. Message left for callback to [phone].
Admission assessment complete, see flowsheet for complete assessment. Abdomen palpated soft and non-tender. +FM. POC discussed with patient and mother, verbalize understanding and agreement. Questions answered accordingly. Deny any needs or concerns at this time. Pt resting comfortably in bed with mother at bedside. Encouraged to call prn-verbalizes understanding.
Admission assessment complete.
After educating self, patient states she declines both MMR and Tdap at this time, states will get a later time
Assessment completed as noted, boil on left buttocks is healed, is closed, no drainage, no redness, slightly discolored from skin tone    Plan of care discussed
Bedside Report of care received from, Nikki Dickerson RN. Pt stable.
Bedside report received from Ciara Ho RN. Pt care assumed. Assessment completed. Pt encouraged to call with needs.
Bedside shift report received from Monalisa Gomez RN. Patient care assumed.
Call placed to Dr Alfredito Marks regarding contractions more consistent now, FHT with early decels and patient feeling increased pressure.  Orders received to resume pushing
Chart reviewed. Patient with + UDS for THC on 2/10/17 (17w3d), 3/6/17 (20w6d), 4/6/17 (25w2d), & 5/2/17 (29w). Patient then had 8 negative urine drug screens. Baby UDS negative at birth. MDS pending. 0991 -  met with patient who gave  permission to complete assessment with her mother Pacheco Navas present. Baby's name is Adalid Wilburn. No name provided for FOB as he is not involved. Patient confirms a history of opiate/heroin abuse, and states that she sought treatment at [facility] ([phone]) approximately 10 months ago. Patient was initially on suboxone and then transitioned to subutex when she became pregnant. [address] [facility] [address], but will be staying with her mother for approximately 1 month after delivery ([place]). Patient states that she has a car seat, crib, and all needed items to care for baby Tuyet Calhoun. Patient expressed understanding when told that DSS will be contacted today due to history of positive urine drug screens during third trimester. 1100 - Report made to [place] (P: [phone]) due to positive urine drug screens during third trimester. At the request of DSS, copy of all urine drug screens faxed to [phone] - Phone call received from [facility] w/[address] ([phone]). Kasie states that she's on her way to the hospital to meet with patient.  informed patient that DSS representative will be arriving shortly.     Azeb Hall, [address] [facility]
Clarified with Dr Nadeen Ashley, per pharmacy request, pt may receive Subutex and Norco together.
Delivery Note    Dr Rowena Rios arrived to bedside at 1    Pt positioned for delivery and set up at 1110. Spontaneous vaginal delivery of viable male infant @ 7750    CBYUV'W 2 6. Perineum with second degree and repaired. Placenta delivered @ [phone]    See delivery summary for details.
Dr Berry Croissant at , pushing resumes with MD
Dr Katja Jennings called. Report given to MD regarding SROM clear fluid, SVE 10/c/+1, patient feels very hot-oral temp 99.9, admission WBC 16.   No order received at this time, MD will be in route to hospital soon    Table set for delivery
Dr Tata Bob at , SVE performed.   Will begin pushing with MD
Dr. Kushal Haile at nurse's station; plan of care discussed with MD. Orders received.  MD aware of \"boil\" on buttock MRSA swab ordered
Dr. Mendez File called on phone and updated on patient status, SVE per flowsheet, and pt Subutex medication ordered in [facility]. No new orders received.
Dr. Richie Milton called on cell and home phone, went to voicemail. Dr. Emiliano Yost at nurses' station. MD notified of patient status, request for an epidural, and RN unable to reach OB. Pt ok to receive epidural per Dr. Emiliano Yost.
Dr. Scott Duenas called to nurses' station. MD updated on patient status, SVE, epidural placement. No new orders received.
Dr. Tito Maravilla called on phone, went to voicemail.
Educated patient on Tdap and need for MMR, gave CDC handout, encouraged vaccines, Mother to read and make a decision
FHTs not tracing well, maternal movement, toco and U/S adjusted frequently  2124 Maternal movement, FHTs not tracing well, toco and U/S adjusted frequently  2130 Pt up to void  2134 Maternal movement, FHTs not tracing well, toco and U/S adjusted frequently  2220 Pt to right side, FHTs not tracing well, toco and U/S adjusted frequently  2337 Pt up to void
IV removed, 18 gauge catheter in tact, pt tolerated well.
MRSA swab obtained, specimen sent to lab. Quarter size wound on left buttock, closed and no drainage noted, transparent tegaderm applied per MD order. 1953 Pt up to void, UDS obtained, sent to lab.
Norco 2 tabs PO and Motrin 1 tab PO given per patient request.  See MAR.
Nursing supervisor brought Subutex up from pharmacy, subutex placed in MIU safe by Sydney Cardenas RN; this RN notified.
Patient assessment completed as noted.
Patient discharged to room. Infant staying in hospital for care.
Pericare performed. OB pads,gown, ice pack changed.   Assisted to stretcher for transfer to room 452
Post-Partum Day Number 2 Progress/Discharge Note  Radha Mendoza  [id number]    Patient doing well post-partum without significant complaint. Voiding without difficulty, normal lochia, positive flatus. Vitals:  Patient Vitals for the past 8 hrs:   BP Temp Pulse Resp   17 0734 117/49 97.6 °F (36.4 °C) (!) 56 16     Temp (24hrs), Av.8 °F (36.6 °C), Min:97.6 °F (36.4 °C), Max:98 °F (36.7 °C)      Vital signs stable, afebrile. Exam:  Patient without distress. Abdomen soft, fundus firm at level of umbilicus, nontender              Labs: No results found for this or any previous visit (from the past 24 hour(s)). Assessment and Plan:  Patient appears to be having uncomplicated post-partum course. Continue routine perineal care and maternal education. Plan discharge for today with follow up in our office in 6 weeks.  Baby being kept due to withdrawal
Prescription given to patient and mother
Progress Note                               Patient: Lakshmi Rodriguez MRN: [id number]  SSN: [id number]    YOB: 1990  Age: 23 y.o. Sex: female      Postpartum Day Number 1    Subjective:     Patient doing well postpartum without significant complaints. Voiding without difficulty. Patient reports normal lochia. .  Pain well controlled, voiding on her own without difficulty. Pumping. Denies HA, SOB/CP, RUQ pain, Vision changes. Objective:     Patient Vitals for the past 12 hrs:   Temp Pulse Resp BP   17 0806 98.3 °F (36.8 °C) 62 18 102/52       Temp (24hrs), Av.8 °F (37.1 °C), Min:97.8 °F (36.6 °C), Max:99.6 °F (37.6 °C)      Physical Exam:    Constitutional: She appears well-developed and well-nourished. No distress. HENT:    Head: Normocephalic and atraumatic.    Cardiovascular: RRR  Pulmonary/Chest: CTAB  Abd:  S/NTTP/ND, BS normoactive, fundus firm at umbilicus      Lab/Data Review:  CBC:    Recent Labs      17   1900   WBC  16.0*   HGB  12.6   HCT  38.0   PLT  337     GBS:   Lab Results   Component Value Date/Time    GrBStrep, External Negative 2017     Blood Type:   Lab Results   Component Value Date/Time    ABO/Rh(D) A POSITIVE 2017 07:00 PM    ABO,Rh A Positive 2016        Assessment and Plan:     23 y.o.  ppd# 1 s/p :    1) PP:  Meeting all pp goals, continue routine care  2) Pumping, Rh pos  3) Hep C:  Breastfeeding/pumping is NOT contraindicated; FU w/ Dr Helen Vásquez for mgmnt outpt  4) hx Heroin/MJ abuse:  Subutex 2mg every day; SW/DSS aware  5) Rub NI:  MMR pp           Signed By: Charlie Ramon MD     2017
Pt called out to nurses' station. This RN at bedside; pt states \"pain medication has not helped. \" SVE 4/90/-1. LRFB started for an epidural placement. Cervidil removed. 2303 Dr. Fernando Villa called, went to voicemail, message left.
Pt requests motrin only for pain 6/10.
Pt sitting up in bed, FHTs not tracing well, ultrasound and toco adjusted frequently  2040 ultrasound and toco adjusted frequently  2052 toco and ultrasound adjusted frequently
Pt sitting up on side of bed for an epidural placement  0329 Dr. Shamar Jarrett, anesthesiologist and Rajni Oleary, CRNA at bedside for an epidural placement.    6374 test dose per Dr. Shamar Jarrett, see anesthesia record for dosing, BPs per flowsheet  8832 Pt back to bed with left hip tilt, toco and ultrasound adjusted frequently  0349 Pt to right hip tilt, toco and ultrasound adjusted frequently
Pt to room 433
Pt tup to void  1914 Pt sitting up in bed, FHTs not tracing well, ultrasound and toco adjusted frequently  1920 FHTs not tracing well, ultrasound and toco adjusted frequently  1928 Ultrasound and toco adjusted frequently  1936 ultrasound and toco adjusted frequently
Pushing begins with Dr Alfredito Marks
Pushing stopped at this time, orders received to increase pitocin q 15 minutes until adequate contraction pattern achieved.   Will resume pushing at that time
Referral made to [facility]  home visit program.    John De Anda, [address] [facility]
Repair complete, recovery started.   Infant skin to skin   Epidural infusion stopped
Report received from Marylen Somerset, [facility], [facility].
SBAR OUT Report: Mother    Verbal report given to JHOAN Fatima RN on this patient, who is now being transferred to MIU for routine progression of care. The patient is not wearing a green \"Anesthesia-Duramorph\" band. Report consisted of patient's Situation, Background, Assessment and Recommendations (SBAR).  ID bands were compared with the identification form, and verified with the patient and receiving nurse. Information from the SBAR, Intake/Output, MAR and Recent Results and the Wilber Report was reviewed with the receiving nurse; opportunity for questions and clarification provided.
SBAR bedside report given to Darrle Wright RN. Pt care relinquished. 0715 Subutex counted with Darrel Wright RN and placed back into MIU safe.
SBAR report given to Deann Mcgee RN. Pt care relinquished.
SBAR report received from [employer].   Care of pt assumed    Patient resting comfortably in bed with epidural
SBAR report received, pt care assumed.
Sitz bath provided with instructions for use. Pt verbalized understanding.
Spoke with Dr Cesia Conti, neonatologist regarding history of Leia Cluster.  MD states infant does not need a bath after delivery, routine  care.
Subutex counted with leaving MAYNOR Crabtree,MAYNOR    Nursery notified of history of drug use, and Hep C.   On Subutex
Subutex verified with Manny Falcon, nurse supervisor; one quantity verified; Subutex taken by Manny Falcon to pharmacy for verification, will be placed in MIU safe once verified by pharmacy.
TOCO adjusted
Universal Safety Interventions

## 2022-11-16 NOTE — H&P
History & Physical    Name: Tona Lujan MRN: [id number]  SSN: [id number]    YOB: 1990  Age: 23 y.o. Sex: female      Subjective: G1, EGA 40 2/7 admitted yesterday for IOL, cervidil placed last night, subsequently labored. Problem List  Date Reviewed: 7/6/2017          Codes Class Noted    Normal labor ICD-10-CM: O80, Z37.9  ICD-9-CM: 031  7/19/2017        40 weeks gestation of pregnancy ICD-10-CM: Z3A.40  ICD-9-CM: V22.2  7/19/2017        Suspected damage to fetus from maternal drug use ICD-10-CM: O35. 5XX0  ICD-9-CM: 655.50  3/20/2017    Overview Addendum 5/30/2017  4:03 PM by Mark Lara MD     3/20/2017 at [facility]:  Currently being treated with Subutex 8mg tablet (1/4 tab every day). States that dose keeps her symptom free but cannot stop without symptoms. I told her to continue 2 mgs per day, low dose. May have symptoms on this dose due to decreased blood concentration from dilution of third trimester increased blood volume. Denies any current/recent drug usage. · Continue 2 mgs Subutex. · Increase to 4 mgs if symptoms occur. · Set up prenatal consult with neonatologist and tour of nursery. First pregnancy ICD-10-CM: Z34.00  ICD-9-CM: V22.0  12/12/2016    Overview Addendum 5/18/2017 12:36 PM by Jae Davis MD     FOB - Muna Sifuentes   AFP tetra = negative; [address] ultrasound - could not see all anatomy; will get [facility] ultrasound    Problems:  1. History of heroin abuse   - ? random drug screens   - ? has used subutex but does not have rx for herself to use; now with a clinic [facility] [phone].     - 1/9 taking 2 mg of subutex  2. Hepatitis C   -  Saw CHELSEA Melendez @ [facility]. [phone]. Will get records and speak with him   -  Per Dr. Marea Risen, no treatment for mom or baby.   Mom eligible for treatment after delivery   -  1/9/17 - ALT elevated at 60 (nl <32); all else normal   -  4/8/17 - hep C quant 1,120,000; LFTs normal  3.  4.  Rubella non-immume   - vaccinate postpartum  5. Marijuana use this pregnancy   - random drug screens; counseled. States used to help with nausea   - has tested + twice now 3/12/17; tested positive again 17; positive 17   - finally negative test 5/15             Hepatitis C, acute, maternal, antepartum ICD-10-CM: O98.419, B17.10  ICD-9-CM: 647.63, 070.51  2016    Overview Addendum 2017  4:13 PM by Nelia Toure MD     2017-[facility]  Found out had Hep C this year. Per patient, had U/S that was WNL. Never started tx with meds. Has met with GI specialist, told not to start meds until after delivery. Patient questioned starting treatment while breastfeeding. I reviewed data with patient. No current data or recommendations. 2017:  Labs drawn, see below:  Hep C quant---> 1,120,000  HCV log10---> 6.049  ALT/AST--->-Normal    · Needs third trimester NICU consult and tour. · Told to make appt with GI 4 weeks post partum to see to discuss treatment. · Can breastfeed and no treatment after delivery. History of drug abuse (Chronic) ICD-10-CM: I97.491  ICD-9-CM: 305.93  2016        Rubella non-immune status, antepartum ICD-10-CM: O99.89, Z28.3  ICD-9-CM: 646.83, V15.83  2016        History of cervical LEEP biopsy affecting care of mother, antepartum ICD-10-CM: O34.40, Z98.890  ICD-9-CM: 654.63  2016    Overview Addendum 3/20/2017  4:42 PM by Marilou Merlin, RN     Hx of LEEP in 2013    3/20/2017 at [facility]:  CL 3.1 cm. No PTL symptoms. · Follow up endovaginal cervical lengths for symptoms. Estimated Date of Delivery: 17  OB History    Para Term  AB Living   1 0 0 0 0 0   SAB TAB Ectopic Molar Multiple Live Births   0 0 0  0       # Outcome Date GA Lbr David/2nd Weight Sex Delivery Anes PTL Lv   1 Current                   Ms. Barron Him is admitted with pregnancy at 40w2d for induction of labor due to EGA 40 . Kishore Vizcarra  Prenatal course was complicated by see problem list.  Please see prenatal records for details. Past Medical History:   Diagnosis Date    Anemia     HCV (hepatitis C virus)     History of drug abuse     Rehab January 2016 for heroin    HSIL (high grade squamous intraepithelial lesion) on Pap smear of cervix     Liver disease     hep c     Past Surgical History:   Procedure Laterality Date    HX LEEP PROCEDURE  2015     Social History     Occupational History   [address]           Social History Main Topics    Smoking status: Former Smoker     Packs/day: 0.50     Quit date: 11/21/2016    Smokeless tobacco: Never Used    Alcohol use No    Drug use: No      Comment: hx of heroin addict-quit , last Marijuana 10/2016     Sexual activity: Yes     Partners: Male     Birth control/ protection: None     Family History   Problem Relation Age of Onset    No Known Problems Mother     No Known Problems Father     Breast Cancer Paternal Grandmother     Heart Disease Paternal Grandfather     Ovarian Cancer Neg Hx     Colon Cancer Neg Hx        No Known Allergies  Prior to Admission medications    Medication Sig Start Date End Date Taking? Authorizing Provider   buprenorphine HCl (SUBUTEX) 8 mg sublingual tablet 8 mg by SubLINGual route daily. Yes Historical Provider   calcium carbonate (TUMS) 200 mg calcium (500 mg) chew Take 1 Tab by mouth daily. Indications: as needed   Yes Historical Provider   prenatal no. 25-iron-FA #6-dha 30 mg iron-1mg -200 mg cap Take  by mouth. [facility]   Yes Historical Provider        Review of Systems: A comprehensive review of systems was negative except for that written in the History of Present Illness. Objective:     Vitals:  Vitals:    07/20/17 0732 07/20/17 0740 07/20/17 0754 07/20/17 0810   BP:  112/53 122/56 119/59   Pulse:  61 65 68   Resp:       Temp: 99.5 °F (37.5 °C)      Weight:       Height:            Physical Exam:  Patient without distress.   Heart: Regular rate and rhythm, S1S2 present or without murmur or extra heart sounds  Lung: clear to auscultation throughout lung fields, no wheezes, no rales, no rhonchi and normal respiratory effort  Fundus: soft and non tender  Lower Extremities:  - Edema 1+   - Patellar Reflexes: 1+ bilaterally   - Clonus: absent  Membranes:  Intact  Fetal Heart Rate: Reactive          Prenatal Labs:   Lab Results   Component Value Date/Time    ABO/Rh(D) A POSITIVE 07/19/2017 07:00 PM    Rubella, External Non Immune 12/06/2016    HBsAg, External Negative 12/06/2016    HIV, External Non Reactive 12/06/2016    RPR, External Non Reactive 12/06/2016    Gonorrhea, External Negative 11/23/2016    Chlamydia, External Negative 11/23/2016    ABO,Rh A Positive 12/06/2016    GrBStrep, External Negative 06/21/2017       Impression/Plan:     Active Problems:    Normal labor (7/19/2017)      40 weeks gestation of pregnancy (7/19/2017)         Plan: Admit for induction of labor. Group B Strep negative.     Signed By:  Author Maximiliano MD     July 20, 2017 Statement Selected